# Patient Record
Sex: MALE | Race: WHITE
[De-identification: names, ages, dates, MRNs, and addresses within clinical notes are randomized per-mention and may not be internally consistent; named-entity substitution may affect disease eponyms.]

---

## 2022-10-04 ENCOUNTER — HOSPITAL ENCOUNTER (EMERGENCY)
Dept: HOSPITAL 47 - EC | Age: 32
LOS: 1 days | Discharge: PSYCHIATRIC OR SUBSTANCE USE FACILITY | End: 2022-10-05
Payer: COMMERCIAL

## 2022-10-04 VITALS — HEART RATE: 110 BPM | SYSTOLIC BLOOD PRESSURE: 164 MMHG | DIASTOLIC BLOOD PRESSURE: 97 MMHG

## 2022-10-04 VITALS — RESPIRATION RATE: 16 BRPM

## 2022-10-04 VITALS — TEMPERATURE: 98.4 F

## 2022-10-04 DIAGNOSIS — R51.9: ICD-10-CM

## 2022-10-04 DIAGNOSIS — Z79.899: ICD-10-CM

## 2022-10-04 DIAGNOSIS — F41.9: ICD-10-CM

## 2022-10-04 DIAGNOSIS — F17.200: ICD-10-CM

## 2022-10-04 DIAGNOSIS — Z88.5: ICD-10-CM

## 2022-10-04 DIAGNOSIS — R45.851: Primary | ICD-10-CM

## 2022-10-04 DIAGNOSIS — I62.9: ICD-10-CM

## 2022-10-04 DIAGNOSIS — I10: ICD-10-CM

## 2022-10-04 DIAGNOSIS — R07.89: ICD-10-CM

## 2022-10-04 LAB
ALBUMIN SERPL-MCNC: 4.7 G/DL (ref 3.5–5)
ALP SERPL-CCNC: 77 U/L (ref 38–126)
ALT SERPL-CCNC: 87 U/L (ref 4–49)
ANION GAP SERPL CALC-SCNC: 14 MMOL/L
AST SERPL-CCNC: 47 U/L (ref 17–59)
BASOPHILS # BLD AUTO: 0 K/UL (ref 0–0.2)
BASOPHILS NFR BLD AUTO: 0 %
BUN SERPL-SCNC: 15 MG/DL (ref 9–20)
CALCIUM SPEC-MCNC: 9.9 MG/DL (ref 8.4–10.2)
CHLORIDE SERPL-SCNC: 104 MMOL/L (ref 98–107)
CO2 SERPL-SCNC: 22 MMOL/L (ref 22–30)
EOSINOPHIL # BLD AUTO: 0.5 K/UL (ref 0–0.7)
EOSINOPHIL NFR BLD AUTO: 5 %
ERYTHROCYTE [DISTWIDTH] IN BLOOD BY AUTOMATED COUNT: 5.05 M/UL (ref 4.3–5.9)
ERYTHROCYTE [DISTWIDTH] IN BLOOD: 12.3 % (ref 11.5–15.5)
GLUCOSE SERPL-MCNC: 104 MG/DL (ref 74–99)
HCT VFR BLD AUTO: 43.9 % (ref 39–53)
HGB BLD-MCNC: 15.1 GM/DL (ref 13–17.5)
LYMPHOCYTES # SPEC AUTO: 2.4 K/UL (ref 1–4.8)
LYMPHOCYTES NFR SPEC AUTO: 26 %
MAGNESIUM SPEC-SCNC: 2 MG/DL (ref 1.6–2.3)
MCH RBC QN AUTO: 29.9 PG (ref 25–35)
MCHC RBC AUTO-ENTMCNC: 34.4 G/DL (ref 31–37)
MCV RBC AUTO: 86.9 FL (ref 80–100)
MONOCYTES # BLD AUTO: 0.5 K/UL (ref 0–1)
MONOCYTES NFR BLD AUTO: 6 %
NEUTROPHILS # BLD AUTO: 5.7 K/UL (ref 1.3–7.7)
NEUTROPHILS NFR BLD AUTO: 62 %
PLATELET # BLD AUTO: 326 K/UL (ref 150–450)
POTASSIUM SERPL-SCNC: 4.1 MMOL/L (ref 3.5–5.1)
PROT SERPL-MCNC: 7 G/DL (ref 6.3–8.2)
SODIUM SERPL-SCNC: 140 MMOL/L (ref 137–145)
WBC # BLD AUTO: 9.2 K/UL (ref 3.8–10.6)

## 2022-10-04 PROCEDURE — 99285 EMERGENCY DEPT VISIT HI MDM: CPT

## 2022-10-04 PROCEDURE — 70498 CT ANGIOGRAPHY NECK: CPT

## 2022-10-04 PROCEDURE — 36415 COLL VENOUS BLD VENIPUNCTURE: CPT

## 2022-10-04 PROCEDURE — 70496 CT ANGIOGRAPHY HEAD: CPT

## 2022-10-04 PROCEDURE — 71045 X-RAY EXAM CHEST 1 VIEW: CPT

## 2022-10-04 PROCEDURE — 85025 COMPLETE CBC W/AUTO DIFF WBC: CPT

## 2022-10-04 PROCEDURE — 85379 FIBRIN DEGRADATION QUANT: CPT

## 2022-10-04 PROCEDURE — 80053 COMPREHEN METABOLIC PANEL: CPT

## 2022-10-04 PROCEDURE — 93005 ELECTROCARDIOGRAM TRACING: CPT

## 2022-10-04 PROCEDURE — 80306 DRUG TEST PRSMV INSTRMNT: CPT

## 2022-10-04 PROCEDURE — 70450 CT HEAD/BRAIN W/O DYE: CPT

## 2022-10-04 PROCEDURE — 96365 THER/PROPH/DIAG IV INF INIT: CPT

## 2022-10-04 PROCEDURE — 96366 THER/PROPH/DIAG IV INF ADDON: CPT

## 2022-10-04 PROCEDURE — 83735 ASSAY OF MAGNESIUM: CPT

## 2022-10-04 PROCEDURE — 84484 ASSAY OF TROPONIN QUANT: CPT

## 2022-10-04 PROCEDURE — 96375 TX/PRO/DX INJ NEW DRUG ADDON: CPT

## 2022-10-04 NOTE — CT
EXAMINATION TYPE: CT angio head neck

CT DLP: 589.2 mGycm, Automated exposure control for dose reduction was used.

 

DATE OF EXAM: 10/4/2022 9:29 PM

 

COMPARISON: CT brain same day.  

 

CLINICAL INDICATION:Male, 31 years old with history of Headache, sudden onset; , Headache, sudden ons
et

 

TECHNIQUE: Axially acquired helical CT angiogram of the head and neck was obtained with contrast. Axi
al images are supplemented with 3D reconstructions which were post-processed at an independent workst
ation. NASCET criteria used.

Contrast used:65ml mL of Isovue 370 with IV Contrast, 

Oral contrast used: None. 

 

FINDINGS:

 

CTA HEAD:

No evidence of acute intracranial hemorrhage, mass effect, or midline shift. The ventricles, sulci, a
nd cisterns are unremarkable. 

 

The visualized portions of the internal carotid arteries, middle cerebral arteries, anterior cerebral
 arteries, and posterior cerebral arteries are patent. 

 

The basilar and vertebral arteries are patent. Atrophic left dominant right transverse sinus.

 

CTA NECK:

Right Carotid System: 

The common carotid artery and external carotid artery are patent. The carotid bifurcation demonstrate
s no evidence of hemodynamically significant stenosis. The remaining portions of the internal carotid
 artery demonstrate normal size without significant narrowing.

 

Left Carotid System: 

The common carotid artery and external carotid artery are patent. The carotid bifurcation demonstrate
s no evidence of hemodynamically significant stenosis. The remaining portions of the internal carotid
 artery demonstrate normal size without significant narrowing.

 

Vertebral arteries are patent without evidence hemodynamically significant stenosis. Right dominant v
ertebral artery system. The left vertebral artery is diminutive and may terminate as the posterior ball
perior cerebellar artery.

 

There is a 4 vessel aortic arch. The origins of the great vessels are patent. No evidence of hemodyna
mically significant stenosis.

 

IMPRESSION:

1. No evidence of dissection of the cervical internal carotid arteries or vertebral arteries or any e
vidence of significant stenosis at the carotid bifurcations. 

2. No evidence of intracranial high-grade stenosis or intracranial aneurysm.

## 2022-10-04 NOTE — ED
General Adult HPI





- General


Chief complaint: Chest Pain


Stated complaint: Chest Pain


Time Seen by Provider: 10/04/22 19:04


Source: patient, RN notes reviewed


Mode of arrival: ambulatory


Limitations: no limitations





- History of Present Illness


Initial comments: 





This is a pleasant 31-year-old male who presents to the emergency department 

complaining of headache and chest discomfort.  Patient states about 5:00 he just

finished eating and developed a headache which came on fairly suddenly.  Patient

states it was throbbing and sharp behind his eyes.  Patient still has the 

headache.  He states the headache currently as severe.  Patient also ascertains 

that he has some nausea.  Patient states she has no history of headaches.  

Patient does have a history of pulmonary embolism as well as hypertension and 

panic attacks.  Patient states he takes lisinopril, metoprolol, and Lipitor.


Patient currently at [facility] for opioids and metham

phetamine.


Patient also states that he previously had bilateral pulmonary emboli about 10 

years ago.





Patient states that the chest discomfort is intermittent.  Apparently he was 

given nitroglycerin in the ambulance.  This may worsen the headache.  She 

denying any vertiginous symptoms.  No vision change.  Headache currently 

throbbing.  Patient does ascertains that he has some neck stiffness.


Note that the patient states he has experienced chest discomfort such as this 

previously when he has had panic attacks.


Patient states he has been at [facility] for about 10 days.





no fever or chills, no changes in vision or hearing, no sore throat or 

difficulty with speech, no neck pain, noshortness of breath, no abdominal pain, 

no nausea or vomiting, no changes in urination or bowel movements, no numbness 

or tingling, no extremity pain, no skin rashes or lesions.





Past medical, surgical, social, and family history reviewed.





- Related Data


                                Home Medications











 Medication  Instructions  Recorded  Confirmed


 


Acetaminophen Tab [Tylenol] 650 mg PO TID 10/04/22 10/04/22


 


Atorvastatin [Lipitor] 10 mg PO HS 10/04/22 10/04/22


 


Calcium/Magnesium/Zinc/Vitamin D 1 tab PO TID 10/04/22 10/04/22


 


Chlorpheniramine Maleate 4 mg PO Q4H PRN 10/04/22 10/04/22





[Chlor-Trimeton]   


 


Docusate [Colace] 100 mg PO BID PRN 10/04/22 10/04/22


 


Ibuprofen [Motrin Ib] 600 mg PO Q6H PRN 10/04/22 10/04/22


 


Metoprolol Tartrate [Lopressor] 50 mg PO DAILY 10/04/22 10/04/22


 


Multivitamins, Thera [Multivitamin 1 tab PO DAILY 10/04/22 10/04/22





(formulary)]   


 


Nicotine [Nicoderm Cq 14 mg] 1 patch TRANSDERM DAILY PRN 10/04/22 10/04/22


 


OLANZapine ODT [ZyPREXA ZYDIS] 5 mg PO DAILY PRN 10/04/22 10/04/22


 


OLANZapine [ZyPREXA] 15 mg PO HS 10/04/22 10/04/22


 


Paliperidone IM [Invega Sustenna] 156 mg IM QMONTHLY 10/04/22 10/04/22


 


Thiamine [Vitamin B-1] 100 mg PO DAILY PRN 10/04/22 10/04/22


 


bisacodyL [Dulcolax] 5 mg PO BID PRN 10/04/22 10/04/22


 


busPIRone HCl [Buspar] 10 mg PO TID 10/04/22 10/04/22


 


cloNIDine HCL [Catapres] 0.1 - 0.3 mg PO Q4H PRN 10/04/22 10/04/22


 


guaiFENesin SYRUP 100MG/5ML 200 mg PO Q4H PRN 10/04/22 10/04/22





[Robitussin]   


 


lisinopriL [Prinivil] 20 mg PO DAILY 10/04/22 10/04/22


 


traZODone HCL [Desyrel] 50 mg PO HS 10/04/22 10/04/22











                                    Allergies











Allergy/AdvReac Type Severity Reaction Status Date / Time


 


codeine AdvReac  Vomiting Verified 10/04/22 19:02














Review of Systems


ROS Statement: 


Those systems with pertinent positive or pertinent negative responses have been 

documented in the HPI.





ROS Other: All systems not noted in ROS Statement are negative.





Past Medical History


Past Medical History: Hypertension


Additional Past Medical History / Comment(s): Back problems.


History of Any Multi-Drug Resistant Organisms: None Reported


Additional Past Surgical History / Comment(s): Back


Past Psychological History: Anxiety, Schizophrenia


Smoking Status: Current every day smoker


Past Alcohol Use History: None Reported


Past Drug Use History: Methamphetamine, Opiates





General Exam





- General Exam Comments


Initial Comments: 





Patient appears very mild distress.  Vital signs reviewed.  Patient noted to be 

hypertensive.  Cranial nerves II through XII are intact.  Patient is alert and 

oriented 4.  Does not appear to be ill or toxic.


Limitations: no limitations


General appearance: alert, in no apparent distress, in distress


Head exam: Present: atraumatic, normocephalic, normal inspection


Eye exam: Present: normal appearance, PERRL, EOMI.  Absent: scleral icterus, c

onjunctival injection, periorbital swelling


ENT exam: Present: normal exam, normal oropharynx, mucous membranes moist, 

normal external ear exam.  Absent: mucous membranes dry


Neck exam: Present: normal inspection, full ROM, other (No evidence of nuchal 

rigidity).  Absent: tenderness, meningismus, lymphadenopathy


Respiratory exam: Present: normal lung sounds bilaterally.  Absent: respiratory 

distress, wheezes, rales, rhonchi, stridor


Cardiovascular Exam: Present: normal rhythm, tachycardia, normal heart sounds.  

Absent: systolic murmur, diastolic murmur, rubs, gallop, clicks


GI/Abdominal exam: Present: soft, normal bowel sounds.  Absent: distended, 

tenderness, guarding, rebound, rigid


Extremities exam: Present: normal inspection, full ROM, normal capillary refill.

  Absent: tenderness, pedal edema, joint swelling, calf tenderness


Back exam: Present: normal inspection


Neurological exam: Present: alert, oriented X3, CN II-XII intact, normal gait, 

reflexes normal.  Absent: abnormal gait, motor sensory deficit


  ** Expanded


Patient oriented to: Present: person, place, time


Speech: Present: fluid speech


Cranial nerves: EOM's Intact: Normal, Gag Reflex: Normal, Tongue Deviation: 

Normal, Nystagmus: Normal, Facial Sensation: Normal, Facial Palsy with Forehead 

Movement: Normal, Facial Palsy without Forehead Movement: Normal


Cerebellar function: Finger to Nose: Normal, Romberg: Normal


Sensory exam: Upper Extremity Light Touch: Normal, Upper Extremity Pin Prick: N

ormal, Lower Extremity Light Touch: Normal, Lower Extremity Pin Prick: Normal


Eye Response: (4) open spontaneously


Motor Response: (6) obeys commands


Verbal Response: (5) oriented


[place] Total: 15


Psychiatric exam: Present: normal affect, normal mood


Skin exam: Present: warm, dry, intact, normal color.  Absent: rash





Course


                                   Vital Signs











  10/04/22 10/04/22





  18:57 19:52


 


Temperature 98.4 F 


 


Pulse Rate 115 H 98


 


Respiratory 22 16





Rate  


 


Blood Pressure 154/95 137/96


 


O2 Sat by Pulse 97 96





Oximetry  














- Reevaluation(s)


Reevaluation #1: 





10/04/22 21:27


Medical record is reviewed


Symptoms are improved here in the emergency department


Patient is informed of results and questions answered


Patient in no distress





Apparently the patient made statements to the ER and that he was having suicidal

 ideations, he was discussing the possibility of walking out into traffic.  EPS 

evaluation ordered.


Reevaluation #2: 





10/04/22 21:56


Medical record is reviewed


Symptoms are improved here in the emergency department


Patient is informed of results and questions answered


Patient in no distress


Patient neurologically intact, cranial nerves II through XII intact, no focal 

deficits


Reevaluation #3: 





10/04/22 22:55


Patient reevaluated.  I did place a call to the on-call neurologist who 

discussed the case with me in detail.  He is driving home and will look at the 

patient's computed tomography scan in about 25 minutes.  Chronic back.  The 

patient himself has no chest symptoms remaining.  Headache has improved.  No 

evidence of neurologic deficit.  No nuchal rigidity.  Heart rate is normalizing.

  Given the patient's history of anxiety and similar chest symptoms and his age 

I think this is unlikely to be cardiac ischemia related.  Opponent was negative.





- Consultations


Consultation #1: 





The case was discussed in detail with ED attending physician.  Presentation, 

findings, treatment plan discussed in detail.  Supervising physician, Dr. Pool.  Call placed for on-call neurology.  Call placed to EPS for evaluation 

as well.


Consultation #2: 





Case again discussed with Dr. Adamewho feels this is a small hemorrhage near the 

formant of [facility].  Request that the patient get transferred to a facility with 

neurosurgical care.


Consultation #3: 





Case discussed in detail with Dr. Delaney, the ER physician at [facility] 

accepts transfer.





EKG Findings





- EKG Comments:


EKG Findings:: EKG done at 1856.  ED attending physician reveals sinus 

tachycardia with a short SD interval.  Rate of 115 bpm.  SD interval is 116 ms. 

 Remainder of the intervals are normal.  Mild baseline artifact.  Normal axis.  

Minimal T-wave flattening noted in lead 3.  No evidence of significant ST 

elevation or depression.  No comparison study





Medical Decision Making





- Medical Decision Making





I'm going to obtain a computed tomography scan of the brain as a patient has no 

significant history of headaches.  This headache also came on fairly suddenly 2 

hours prior to my evaluation.  Patient also states that there seems to be some 

stiff feeling in his neck.





On-call neurology, Dr. Rodriguez, feels this is more indicative of a hemorrhage 

rather than a colloid cyst.  Advises transferred to a neurosurgical center.  

Call placed to [facility].





Discussed all findings with the patient who is improved headache-wise.  Patient 

neurologically intact.  Patient still complaining of some neck stiffness.





The case was discussed in detail with ED attending physician.  Presentation, 

findings, treatment plan discussed in detail.


Supervisor Dr. Pool











Patient was given IV acetaminophen 1000 mg and 1 mg of lorazepam here in the ER.





- Lab Data


Result diagrams: 


                                 10/04/22 19:14





                                 10/04/22 19:14


                                   Lab Results











  10/04/22 10/04/22 10/04/22 Range/Units





  19:14 19:14 19:14 


 


WBC  9.2    (3.8-10.6)  k/uL


 


RBC  5.05    (4.30-5.90)  m/uL


 


Hgb  15.1    (13.0-17.5)  gm/dL


 


Hct  43.9    (39.0-53.0)  %


 


MCV  86.9    (80.0-100.0)  fL


 


MCH  29.9    (25.0-35.0)  pg


 


MCHC  34.4    (31.0-37.0)  g/dL


 


RDW  12.3    (11.5-15.5)  %


 


Plt Count  326    (150-450)  k/uL


 


MPV  7.5    


 


Neutrophils %  62    %


 


Lymphocytes %  26    %


 


Monocytes %  6    %


 


Eosinophils %  5    %


 


Basophils %  0    %


 


Neutrophils #  5.7    (1.3-7.7)  k/uL


 


Lymphocytes #  2.4    (1.0-4.8)  k/uL


 


Monocytes #  0.5    (0-1.0)  k/uL


 


Eosinophils #  0.5    (0-0.7)  k/uL


 


Basophils #  0.0    (0-0.2)  k/uL


 


D-Dimer   0.23   (<0.60)  mg/L FEU


 


Sodium    140  (137-145)  mmol/L


 


Potassium    4.1  (3.5-5.1)  mmol/L


 


Chloride    104  ()  mmol/L


 


Carbon Dioxide    22  (22-30)  mmol/L


 


Anion Gap    14  mmol/L


 


BUN    15  (9-20)  mg/dL


 


Creatinine    0.88  (0.66-1.25)  mg/dL


 


Est GFR (CKD-EPI)AfAm    >90  (>60 ml/min/1.73 sqM)  


 


Est GFR (CKD-EPI)NonAf    >90  (>60 ml/min/1.73 sqM)  


 


Glucose    104 H  (74-99)  mg/dL


 


Calcium    9.9  (8.4-10.2)  mg/dL


 


Magnesium    2.0  (1.6-2.3)  mg/dL


 


Total Bilirubin    0.4  (0.2-1.3)  mg/dL


 


AST    47  (17-59)  U/L


 


ALT    87 H  (4-49)  U/L


 


Alkaline Phosphatase    77  ()  U/L


 


Troponin I     (0.000-0.034)  ng/mL


 


Total Protein    7.0  (6.3-8.2)  g/dL


 


Albumin    4.7  (3.5-5.0)  g/dL


 


Urine Opiates Screen     (NotDetected)  


 


Ur Oxycodone Screen     (NotDetected)  


 


Urine Methadone Screen     (NotDetected)  


 


Ur Propoxyphene Screen     (NotDetected)  


 


Ur Barbiturates Screen     (NotDetected)  


 


U Tricyclic Antidepress     (NotDetected)  


 


Ur Phencyclidine Scrn     (NotDetected)  


 


Ur Amphetamines Screen     (NotDetected)  


 


U Methamphetamines Scrn     (NotDetected)  


 


U Benzodiazepines Scrn     (NotDetected)  


 


Urine Cocaine Screen     (NotDetected)  


 


U Marijuana (THC) Screen     (NotDetected)  














  10/04/22 10/04/22 Range/Units





  19:14 20:56 


 


WBC    (3.8-10.6)  k/uL


 


RBC    (4.30-5.90)  m/uL


 


Hgb    (13.0-17.5)  gm/dL


 


Hct    (39.0-53.0)  %


 


MCV    (80.0-100.0)  fL


 


MCH    (25.0-35.0)  pg


 


MCHC    (31.0-37.0)  g/dL


 


RDW    (11.5-15.5)  %


 


Plt Count    (150-450)  k/uL


 


MPV    


 


Neutrophils %    %


 


Lymphocytes %    %


 


Monocytes %    %


 


Eosinophils %    %


 


Basophils %    %


 


Neutrophils #    (1.3-7.7)  k/uL


 


Lymphocytes #    (1.0-4.8)  k/uL


 


Monocytes #    (0-1.0)  k/uL


 


Eosinophils #    (0-0.7)  k/uL


 


Basophils #    (0-0.2)  k/uL


 


D-Dimer    (<0.60)  mg/L FEU


 


Sodium    (137-145)  mmol/L


 


Potassium    (3.5-5.1)  mmol/L


 


Chloride    ()  mmol/L


 


Carbon Dioxide    (22-30)  mmol/L


 


Anion Gap    mmol/L


 


BUN    (9-20)  mg/dL


 


Creatinine    (0.66-1.25)  mg/dL


 


Est GFR (CKD-EPI)AfAm    (>60 ml/min/1.73 sqM)  


 


Est GFR (CKD-EPI)NonAf    (>60 ml/min/1.73 sqM)  


 


Glucose    (74-99)  mg/dL


 


Calcium    (8.4-10.2)  mg/dL


 


Magnesium    (1.6-2.3)  mg/dL


 


Total Bilirubin    (0.2-1.3)  mg/dL


 


AST    (17-59)  U/L


 


ALT    (4-49)  U/L


 


Alkaline Phosphatase    ()  U/L


 


Troponin I  <0.012   (0.000-0.034)  ng/mL


 


Total Protein    (6.3-8.2)  g/dL


 


Albumin    (3.5-5.0)  g/dL


 


Urine Opiates Screen   Not Detected  (NotDetected)  


 


Ur Oxycodone Screen   Not Detected  (NotDetected)  


 


Urine Methadone Screen   Not Detected  (NotDetected)  


 


Ur Propoxyphene Screen   Not Detected  (NotDetected)  


 


Ur Barbiturates Screen   Not Detected  (NotDetected)  


 


U Tricyclic Antidepress   Not Detected  (NotDetected)  


 


Ur Phencyclidine Scrn   Not Detected  (NotDetected)  


 


Ur Amphetamines Screen   Not Detected  (NotDetected)  


 


U Methamphetamines Scrn   Not Detected  (NotDetected)  


 


U Benzodiazepines Scrn   Not Detected  (NotDetected)  


 


Urine Cocaine Screen   Not Detected  (NotDetected)  


 


U Marijuana (THC) Screen   Not Detected  (NotDetected)  














- Radiology Data


Radiology results: report reviewed, image reviewed





Disposition


Clinical Impression: 


 Acute headache, Intracranial hemorrhage, Suicidal ideation, Uncontrolled 

hypertension, Atypical chest pain





Disposition: TRANSFER TO PSYCH HOSP/UNIT


Condition: Stable


Is patient prescribed a controlled substance at d/c from ED?: No


Referrals: 


None,Stated [Primary Care Provider] - 1-2 days


Time of Disposition: 22:58





- Out of Hospital Transfer - Req. Specs


Out of Hospital Transfer - Requested Specifics: Other Emergency Center

## 2022-10-04 NOTE — CT
EXAMINATION TYPE: CT brain wo con

CT DLP: 1251.4 mGycm, Automated exposure control for dose reduction was used.

 

DATE OF EXAM: 10/4/2022 7:49 PM

 

COMPARISON: None.

 

CLINICAL INDICATION:Male, 31 years old with history of Headache, Sudden onset, Headache, Sudden onset


 

TECHNIQUE: 

Brain: Axial CT images of the brain were obtained with coronal and sagittal reformats created and rev
iewed.

Contrast used: None.

Oral contrast used: None.

 

FINDINGS:

 

Brain:

Extra-axial spaces: No abnormal extra-axial fluid collections.

Ventricular system: Within normal limits, there is a high density focus measuring 5 mm at the foramen
 of Mays on the right.

Cerebral parenchyma: No acute intraparenchymal hemorrhage or mass effect.  The gray-white junction is
 well differentiated. 

Cerebellum: Unremarkable.

Mass effect: No evidence of midline shift.

Intracranial vasculature: unremarkable

Soft tissues: Normal.

Calvarium/osseous structures: No depressed skull fracture.

Paranasal sinuses and mastoid air cells: Mild scattered paranasal sinus disease.

Visualized orbits: Orbital contents are intact.

 

IMPRESSION:

1.  No acute intracranial hemorrhage.

2.  High-density focus measuring 5 mm at the foramen of Mays on the right favored represent a collo
id cyst. No ventricular dilation to suggest obstruction. Consider MRI with IV contrast for further ev
aluation.

## 2022-10-04 NOTE — XR
EXAMINATION TYPE: XR chest 1V portable

 

DATE OF EXAM: 10/4/2022 7:25 PM

 

COMPARISON: None

 

TECHNIQUE: XR chest 1V portable Frontal and lateral views of the chest.

 

CLINICAL INDICATION:Male, 31 years old with history of chest pain; 

 

FINDINGS: 

Lungs/Pleura: There is no evidence of pleural effusion, focal consolidation, or pneumothorax.  

Pulmonary vascularity: Unremarkable.

Heart/mediastinum: Cardiomediastinal silhouette is unremarkable.

Musculoskeletal: No acute osseous pathology. Extensive postsurgical changes of the spine. Hardware ap
pears intact.

 

 

IMPRESSION: 

No acute cardiopulmonary disease/process.